# Patient Record
Sex: FEMALE | Race: AMERICAN INDIAN OR ALASKA NATIVE
[De-identification: names, ages, dates, MRNs, and addresses within clinical notes are randomized per-mention and may not be internally consistent; named-entity substitution may affect disease eponyms.]

---

## 2018-10-04 ENCOUNTER — HOSPITAL ENCOUNTER (EMERGENCY)
Dept: HOSPITAL 5 - ED | Age: 68
Discharge: HOME | End: 2018-10-04
Payer: MEDICARE

## 2018-10-04 VITALS — SYSTOLIC BLOOD PRESSURE: 137 MMHG | DIASTOLIC BLOOD PRESSURE: 70 MMHG

## 2018-10-04 DIAGNOSIS — Z76.0: ICD-10-CM

## 2018-10-04 DIAGNOSIS — G89.29: Primary | ICD-10-CM

## 2018-10-04 DIAGNOSIS — Z91.041: ICD-10-CM

## 2018-10-04 DIAGNOSIS — M54.9: ICD-10-CM

## 2018-10-04 PROCEDURE — 99282 EMERGENCY DEPT VISIT SF MDM: CPT

## 2018-10-04 NOTE — EMERGENCY DEPARTMENT REPORT
ED Recheck HPI





- General


Chief Complaint: Recheck/Abnormal Lab/Rx


Stated Complaint: BODY PAIN


Time Seen by Provider: 10/04/18 15:32


Source: patient


Mode of arrival: Wheelchair


Limitations: No Limitations





- History of Present Illness


Initial Comments: 





This is a 68-year-old female nontoxic, well nourished in appearance, no acute 

signs of distress presents to the ED with c/o of chronic back pain.  Patient 

stated that she has a pain specialist that she sees Dr. Villegas.  Patient stated 

that she usually takes oxycodone/acetaminophen 10 mg but Dr. Villegas lowered her 

dose to 7.5 mg.  Patient stated she wants a prescrption refill for 10 mg.  

Patient stated went to Dr. Villegas office and "refused" to change to 10 mg.  

Patient denies any symptoms besides chronic back pain.  Patient denies any 

chest pain, shortness of breath, fever, chills, nausea, vomiting.


Symptoms Since Prior Visit: no new symptoms


Associated Symptoms: none.  denies: fever, chills, chest pain, shortness of 

breath, rash, malaise, nasuea, abdominal pain





- Related Data


 Allergies











Allergy/AdvReac Type Severity Reaction Status Date / Time


 


IVP DYE AdvReac  Vomiting Uncoded 10/04/18 14:45














ED Review of Systems


ROS: 


Stated complaint: BODY PAIN


Other details as noted in HPI





Constitutional: denies: chills, fever


Eyes: denies: eye pain, eye discharge, vision change


ENT: denies: ear pain, throat pain


Respiratory: denies: cough, shortness of breath, wheezing


Cardiovascular: denies: chest pain, palpitations


Endocrine: no symptoms reported


Gastrointestinal: denies: abdominal pain, nausea, diarrhea


Genitourinary: denies: urgency, dysuria, discharge


Musculoskeletal: denies: back pain, joint swelling, arthralgia


Skin: denies: rash, lesions


Neurological: denies: headache, weakness, paresthesias


Psychiatric: denies: anxiety, depression


Hematological/Lymphatic: denies: easy bleeding, easy bruising





ED Past Medical Hx





- Past Medical History


Additional medical history: CHRONIC BACK PAIN





- Surgical History


Hx Open Heart Surgery: Yes


Additional Surgical History: NECK SURGERY ARM SURGERY





- Social History


Smoking Status: Never Smoker


Substance Use Type: Prescribed





ED Physical Exam





- General


Limitations: No Limitations


General appearance: alert, in no apparent distress





- Head


Head exam: Present: atraumatic, normocephalic





- Eye


Eye exam: Present: normal appearance





- ENT


ENT exam: Present: mucous membranes moist





- Neck


Neck exam: Present: normal inspection





- Respiratory


Respiratory exam: Present: normal lung sounds bilaterally.  Absent: respiratory 

distress





- Cardiovascular


Cardiovascular Exam: Present: regular rate, normal rhythm.  Absent: systolic 

murmur, diastolic murmur, rubs, gallop





- GI/Abdominal


GI/Abdominal exam: Present: soft, normal bowel sounds





- Extremities Exam


Extremities exam: Present: normal inspection





- Back Exam


Back exam: Present: normal inspection





- Neurological Exam


Neurological exam: Present: alert, oriented X3





- Psychiatric


Psychiatric exam: Present: normal affect, normal mood





- Skin


Skin exam: Present: warm, dry, intact, normal color.  Absent: rash





ED Course


 Vital Signs











  10/04/18





  14:46


 


Temperature 98.6 F


 


Pulse Rate 85


 


Respiratory 18





Rate 


 


Blood Pressure 137/70


 


O2 Sat by Pulse 99





Oximetry 














- Reevaluation(s)


Reevaluation #1: 





10/04/18 15:47


Patient is speaking in full sentences with no signs of distress noted.





ED Recheck MDM





- Medical Decision Making





This is a 68-year-old female who presents with medication refill.  Patient is 

stable exam and by me.  To the [place] [facility] has been obtained and indicates that 

patient received 7.5 mg of oxycodone/acetaminophen on 09/28/2018 with quantity 

of 84 for about 28 days.  Currently I will not be able to refill her medication 

as she does have a pain specialist and that was currently just prescribe the 

medication.  Patient stated she is drinving home after discharge so I could not 

of given patient a dose here in the ED.  Patient is otherwise stable with 

normal vital signs and normal physical exam.  Patient was referred to Follow-up 

with a primary care/pain management doctor in 3-5 days or if symptoms worsen 

and continue return to emergency room as soon as possible.  At time of discharge

, the patient does not seem toxic or ill in appearance.  No acute signs of 

distress noted.  Patient agrees to discharge treatment plan of care.  No 

further questions noted by the patient.


Critical care attestation.: 


If time is entered above; I have spent that time in minutes in the direct care 

of this critically ill patient, excluding procedure time.








ED Disposition


Clinical Impression: 


 Medication refill





Chronic back pain


Qualifiers:


 Back pain location: low back pain Back pain laterality: unspecified Sciatica 

presence: unspecified whether sciatica present Qualified Code(s): M54.5 - Low 

back pain





Disposition: DC-01 TO HOME OR SELFCARE


Is pt being admited?: No


Does the pt Need Aspirin: No


Condition: Stable


Instructions:  Chronic Back Pain (ED)


Additional Instructions: 


Follow-up with a primary care/pain management doctor in 3-5 days or if symptoms 

worsen and continue return to emergency room as soon as possible.


Referrals: 


JENNIFER CRYSTAL MD [Primary Care Provider] - 3-5 Days


IRMA SPARROW MD [Referring] - 3-5 Days


[facility] [Outside] - 3-5 Days


[facility] [Outside] - 3-5 Days